# Patient Record
Sex: MALE | Race: WHITE
[De-identification: names, ages, dates, MRNs, and addresses within clinical notes are randomized per-mention and may not be internally consistent; named-entity substitution may affect disease eponyms.]

---

## 2018-12-05 ENCOUNTER — HOSPITAL ENCOUNTER (EMERGENCY)
Dept: HOSPITAL 25 - UCEAST | Age: 63
Discharge: HOME | End: 2018-12-05
Payer: COMMERCIAL

## 2018-12-05 VITALS — SYSTOLIC BLOOD PRESSURE: 125 MMHG | DIASTOLIC BLOOD PRESSURE: 77 MMHG
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
## 2018-12-05 DIAGNOSIS — S30.861A: Primary | ICD-10-CM
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
## 2018-12-05 DIAGNOSIS — Z88.0: ICD-10-CM

## 2018-12-05 PROCEDURE — G0463 HOSPITAL OUTPT CLINIC VISIT: HCPCS

## 2018-12-05 PROCEDURE — 99212 OFFICE O/P EST SF 10 MIN: CPT

## 2018-12-05 NOTE — UC
Skin Complaint HPI





- HPI Summary


HPI Summary: 








Healthy 62 yo male, with c/o tick bite 3 days ago;  tick on for approx 30 

hours.  Are of inguinal ligament, right groin;  tick removed;  surrounding 

redness now.





- History of Current Complaint


Chief Complaint: UCSkin


Time Seen by Provider: 12/05/18 13:54


Stated Complaint: TICK BITE


Pain Intensity: 0





- Allergy/Home Medications


Allergies/Adverse Reactions: 


 Allergies











Allergy/AdvReac Type Severity Reaction Status Date / Time


 


Penicillins Allergy  Unknown Verified 12/05/18 11:59





   Reaction  





   Details  














PMH/Surg Hx/FS Hx/Imm Hx





- Additional Past Medical History


Additional PMH: 





VISIT HISTORY: non contributory to present complaint


MEDICATIONS: non contributory to present complaint


CHRONIC CONDITIONS:none








Family history significant for: negative





SMOKING: negative





EMPLOYMENT: retired


LIVING CONDITIONS: uses cpap; lives with wife.





- Surgical History


Surgical History: Yes


Surgery Procedure, Year, and Place: colon ca





- Social History


Alcohol Use: Rare


Substance Use Type: None


Smoking Status (MU): Never Smoked Tobacco





Review of Systems


All Other Systems Reviewed And Are Negative: Yes


Constitutional: Positive: Negative, Fever


Skin: Positive: Rash - 2.5 cm erythema around a crusted lesion right groin.


Eyes: Positive: Negative


ENT: Positive: Negative


Respiratory: Positive: Negative


Cardiovascular: Positive: Negative


Gastrointestinal: Positive: Negative


Genitourinary: Positive: Negative


Motor: Positive: Negative


Neurovascular: Positive: Negative


Musculoskeletal: Positive: Negative


Neurological: Positive: Negative


Psychological: Positive: Negative


Is Patient Immunocompromised?: No





Physical Exam





- Summary


Physical Exam Summary: 





 


Appearance: The patient is well-appearing, is in no pain or distress, and is 

well-nourished.


Eyes: Conjunctiva are clear.  Pupils are equal and reactive to light and 

accommodation.  Extraocular muscle movement is intact.


ENT: The hearing is grossly normal, the pharynx is normal, and the TMs are 

normal. There is no muffled or hoarse voice.  No stridor.


Neck: The neck is supple and there is no lymphadenopathy.


Respiratory: The chest is nontender to palpation and without crepitus. The 

lungs are clear, there are normal breath sounds, and there is no respiratory 

distress.  No wheezes, rales or rhonchi.


Cardiovascular: Heart sounds reveal a regular rate and rhythm. There are no 

clicks, rubs or murmurs.  There are no carotid bruits or thrills.  Circulation 

is grossly intact.


Abdomen: The abdomen is soft and nontender. There is no organomegaly.  Bowel 

sounds are present and within normal limits.  No point tenderness at McBurneys 

point.


Musculoskeletal: Strength is intact. The patient moves all extremities.  


Neurological: The patient is alert. Motor and sensory examination grossly 

intact.  Speech is normal.


Psychological: The patient displays age appropriate behavior. Oriented to person

, place and time.


Skin: Tick bite located in inguinal region of the right groin.  Surrounding 

erythema to 2.5 cm.  No ascending lymphangitis or cellulitis.  Tick has been 

removed but area is crusted.  Tick head could still be embedded.  I explained 

this to the patient.


Triage Information Reviewed: Yes


Vital Signs: 


 Initial Vital Signs











Temp  98 F   12/05/18 11:56


 


Pulse  70   12/05/18 11:56


 


Resp  17   12/05/18 11:56


 


BP  113/76   12/05/18 11:56


 


Pulse Ox  100   12/05/18 11:56














Course/Dx





- Course


Course Of Treatment: 63 you male with tick bite for 30 hours 3 days ago now 

with surrounding erythema, possibly migrans, in right groin area.  Will start 

on doxycycline, 100mg, twice a day for 14 days.





- Differential Diagnoses - Skin Complaint


Differential Diagnoses: Cellulitis, Foreign Body, Other - tick bite, head may 

still be embedded under skin





- Diagnoses


Provider Diagnosis: 


 Tick bite








Discharge





- Sign-Out/Discharge


Documenting (check all that apply): Patient Departure


All imaging exams completed and their final reports reviewed: No Studies





- Discharge Plan


Condition: Stable


Disposition: HOME


Prescriptions: 


DOXYcycline CAP(*) [DOXYcycline 100MG CAP(*)] 100 mg PO BID #30 cap MDD 2


Patient Education Materials:  Lyme Disease (ED), Tick Bite (ED)


Referrals: 


No Primary Care Phys,NOPCP [Primary Care Provider] - 


Additional Instructions: 


AS WE DISCUSSED: 


 


PLEASE SEEK CARE AT THE EMERGENCY DEPARTMENT IF SYMPTOMS WORSEN OR IF NEW 

SYMPTOMS DEVELOP.  





FOLLOW UP WITH YOUR PRIMARY CARE PHYSICIAN IF CONDITION CONTINUES BEYOND 3 DAYS 

WITHOUT IMPROVEMENT.





YOUR DIAGNOSIS IS: tick bite





YOUR PRESCRIPTION RECOMMENDATION IS: doxycycline, twice a day for 14 days





OTHER INSTRUCTIONS: warm moist soaks to the area.  Watch for any spreading 

infection.





- Billing Disposition and Condition


Condition: STABLE


Disposition: Home

## 2020-03-02 ENCOUNTER — HOSPITAL ENCOUNTER (EMERGENCY)
Dept: HOSPITAL 25 - UCEAST | Age: 65
Discharge: HOME | End: 2020-03-02
Payer: OTHER GOVERNMENT

## 2020-03-02 VITALS — DIASTOLIC BLOOD PRESSURE: 68 MMHG | SYSTOLIC BLOOD PRESSURE: 109 MMHG

## 2020-03-02 DIAGNOSIS — S82.454A: Primary | ICD-10-CM
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
## 2020-03-02 DIAGNOSIS — Z88.0: ICD-10-CM

## 2020-03-02 DIAGNOSIS — Z79.82: ICD-10-CM
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
## 2020-03-02 PROCEDURE — G0463 HOSPITAL OUTPT CLINIC VISIT: HCPCS

## 2020-03-02 PROCEDURE — 99211 OFF/OP EST MAY X REQ PHY/QHP: CPT

## 2020-03-02 NOTE — UC
Lower Extremity/Ankle HPI





- HPI Summary


HPI Summary: 


64-year-old male presents with complaints of right lower leg and ankle pain.  

States on 2/27/2020 he was skiing, his ski got caught causing and external 

rotation injury to the leg and ankle.  Reports he has been able to bear weight 

on the leg however with a great deal of discomfort and he does not feel that he 

can adequately support himself therefore has been using crutches to ambulate.  

Reports bruising and swelling to the lower leg and ankle although has improved 

some.  Denies any numbness or tingling.





- History of Current Complaint


Chief Complaint: UCLowerExtremity


Stated Complaint: ANKLE INJURY


Time Seen by Provider: 03/02/20 12:11


Hx Obtained From: Patient


Pain Intensity: 2





- Allergies/Home Medications


Allergies/Adverse Reactions: 


 Allergies











Allergy/AdvReac Type Severity Reaction Status Date / Time


 


Penicillins Allergy  Unknown Verified 03/02/20 12:07





   Reaction  





   Details  











Home Medications: 


 Home Medications





Aspirin 81 mg CHEW TAB* [Aspirin Low Dose TAB*] 162 mg PO Q6H 03/02/20 [History 

Confirmed 03/02/20]


Ibuprofen TAB* [Advil TAB*] 200 mg PO Q6H PRN 03/02/20 [History Confirmed 03/02/ 20]











PMH/Surg Hx/FS Hx/Imm Hx


Previously Healthy: Yes





- Surgical History


Surgical History: Yes


Surgery Procedure, Year, and Place: colon ca





- Family History


Known Family History: 


   Negative: Diabetes





- Social History


Occupation: Retired


Lives: With Family


Alcohol Use: Occasionally


Substance Use Type: None


Smoking Status (MU): Never Smoked Tobacco





Review of Systems


All Other Systems Reviewed And Are Negative: Yes


Constitutional: Positive: Negative


Skin: Positive: Bruising


Respiratory: Positive: Negative


Cardiovascular: Positive: Negative


Gastrointestinal: Positive: Negative


Genitourinary: Positive: Negative


Motor: Negative: Weakness


Neurovascular: Negative: Decreased Sensation


Musculoskeletal: Positive: Other: - See HPI


Neurological/Mental Status: Positive: Negative


Is Patient Immunocompromised?: No





Physical Exam





- Summary


Physical Exam Summary: 


GENERAL APPEARANCE: Well developed, well nourished, alert and cooperative, and 

appears to be in no acute distress.





CARDIAC: Normal S1 and S2. No S3, S4 or murmurs. Rhythm is regular. There is no 

peripheral edema, cyanosis or pallor. Extremities are warm and well perfused. 

Capillary refill is less than 2 seconds. Peripheral pulses intact.





LUNGS: Clear to auscultation without rales, rhonchi, wheezing or diminished 

breath sounds.





ABDOMEN: Positive bowel sounds. Soft, nondistended, nontender. No guarding or 

rebound. No masses or hepatosplenomegally.





MUSKULOSKELETAL: Normal muscular development. Non-weightbearing using crutches.





EXTREMITIES: Tenderness along the lateral aspect of the right lower leg and to 

the anterior ankle with moderate edema and eccymosis present. Decreased ROM of 

the ankle due to pain and edema. Circulation and sensation intact.





SKIN: Skin normal color, texture and turgor with no lesions or eruptions.





Triage Information Reviewed: Yes


Vital Signs: 


 Initial Vital Signs











Temp  98.5 F   03/02/20 12:02


 


Pulse  58   03/02/20 12:02


 


Resp  16   03/02/20 12:02


 


BP  109/68   03/02/20 12:02


 


Pulse Ox  100   03/02/20 12:02











Vital Signs Reviewed: Yes





Procedures





- Splinting


  ** Right Lower Extremity


Location: Right lower leg


Hand-Made Type: orthoglass


Splint: Posterior short leg


Pre-Proc Neuro Vasc Exam: normal


Post-Proc Neuro Vasc Exam: normal


Splint Applied by Provider: Ryder Topete





Diagnostics





- Radiology


  ** No standard instances


Radiology Interpretation Completed By: Radiologist


Summary of Radiographic Findings: Order Information: TIBIA FIBULA RIGHT.  

HISTORY: skiing injury, external rotation of the leg .  COMPARISONS: March 02, 2020 right ankle.  VIEWS: 2, Frontal and lateral views of the right foreleg.  

FINDINGS:  BONE DENSITY: Normal.  BONES: There is a comminuted nondisplaced 

fracture involving the mid and proximal fibular diaphysis.  JOINTS: There is no 

arthropathy.  ALIGNMENT: There is no dislocation.  SOFT TISSUES: Unremarkable.  

OTHER FINDINGS: None.  IMPRESSION: COMMINUTED FRACTURE OF THE PROXIMAL FIBULA.  

Order Information: ANKLE RIGHT 3+VWS.  Indication: Swollen ankle with ankle 

injury.  3 views of the right ankle demonstrates soft tissue swelling. There is 

suggestion of a bony fragment in the proximal scaphoid whose margins are well-

corticated. Clinical correlation is suggested but this likely represents an old 

avulsion or accessory ossicle. Inferior calcaneal spur is noted.  IMPRESSION: 

Bony fragment off the inferior cuboid which is well-corticated likely 

represents accessory ossicle or old injury. Inferior calcaneal spur is noted. 

The medial malleolus and fibula are unremarkable.





Lower Extremity Course/Dx





- Course


Course Of Treatment: 


64-year-old male presents with complaints of right lower leg and ankle pain.  

States on 2/27/2020 he was skiing, his ski got caught causing and external 

rotation injury to the leg and ankle.  Reports he has been able to bear weight 

on the leg however with a great deal of discomfort and he does not feel that he 

can adequately support himself therefore has been using crutches to ambulate.  

Reports bruising and swelling to the lower leg and ankle although has improved 

some.  Denies any numbness or tingling.  Afebrile.  Vital signs stable.  On 

exam patient had tenderness along the lateral aspect of the right lower leg and 

to the anterior ankle with moderate edema and eccymosis present. Decreased ROM 

of the ankle due to pain and edema. Circulation and sensation intact.  X-ray 

showed a comminuted nondisplaced fracture involving the mid and proximal 

fibular diaphysis.  Reviewed results with the patient.  I placed him in a 

posterior short leg splint using Ortho-Glass.  Patient instructed to remain 

nonweightbearing.  Recommending conservative treatment for a nondisplaced 

comminuted fractures of the right tibia including over-the-counter analgesics 

and RICE.  He is to follow-up with orthopedic surgery in 3-5 days.  

Anticipatory guidance, splint care, and warning symptoms were reviewed with the 

patient.  Verbalizes understanding and agrees with plan of care.








- Differential Dx/Diagnosis


Differential Diagnosis/HQI/PQRI: Contusion, Dislocation, Fracture (Closed), 

Sprain


Provider Diagnosis: 


 Closed nondisplaced comminuted fracture of shaft of right fibula








Discharge ED





- Sign-Out/Discharge


Documenting (check all that apply): Patient Departure


All imaging exams completed and their final reports reviewed: Yes





- Discharge Plan


Condition: Stable


Disposition: HOME


Patient Education Materials:  Leg Fracture (ED), Crutch Instructions (ED), 

Splint Care (ED)


Referrals: 


No Primary Care Phys,NOPCP [Primary Care Provider] - 


Christiano Shah MD [Medical Doctor] -  (Follow up in 3-5 days. Call for 

appointment.)


Additional Instructions: 


The x-ray performed in the clinic today showed  evidence of a non-displaced 

comminuted fracture of the right fibula.





Rest the leg as much as possible. You should remain non-weigh bearing at this 

time. Continue using your crutches.





Wear the splint that was applied in the clinic at all times. Be sure not to get 

this wet.





Apply ice to the affected area for 15-20 minutes at least 4 times a day to help 

with the pain and swelling.





Elevate the leg to help reduce swelling.





Take acetaminophen (Tylenol) or ibuprofen (Advil, Motrin) according to 

directions as needed for pain.





Follow up with orthopedic surgery in 3-5 days for further evaluation and 

treatment. Call for appointment.





Seek immediate medical attention if you have severe pain not managed with pain 

medication, develop numbness or tingling in the leg, foot, or toes, or have any 

worsening of symptoms.





- Billing Disposition and Condition


Condition: STABLE


Disposition: Home

## 2020-03-02 NOTE — XMS REPORT
Continuity of Care Document (CCD)

 Created on:2020



Patient:Ash Constantino

Sex:Male

:1955

External Reference #:[id number]





Demographics







 Address  [address] [place] [address]

 

 Mobile Phone  [phone]

 

 Email Address  [email or web address]

 

 Preferred Language  en

 

 Marital Status  Not  or 

 

 Lutheran Affiliation  Unknown

 

 Race  White

 

 Ethnic Group  Not  or 









Author







 Name  Kami Juan DNP, RN, FNP-BC (transmitted by agent of provider 
Lilly Thorne)

 

 Address  [address] [facility], [address]



   [place] [address]









Care Team Providers







 Name  Role  Phone

 

 KevinNury nichols DO - Family  Care Team Information   [phone]
[phone]



 Medicine    









Problems







 Active Problems  Provider  Date

 

 Obstructive sleep apnea syndrome  Jonel Crocker M.D.  Onset: 2015

 

 Jet lag  Kami Juan DNP, RN, FNP-BC  Onset: 10/23/2017







Social History







 Type  Date  Description  Comments

 

 Birth Sex    Unknown  

 

 ETOH Use    Currently consumes alcohol  

 

 ETOH Use    Occasionally consumes alcohol  

 

 Tobacco Use  Start: Unknown  Patient has never smoked  

 

 Recreational Drug Use    Denies Drug Use  

 

 Smoking Status  Reviewed: 20  Patient has never smoked  

 

 Exercise Type/Frequency    Exercises regularly  







Allergies, Adverse Reactions, Alerts







 Active Allergies  Reaction  Severity  Comments  Date

 

 Penicillin        2012







Medications







 Active Medications  SIG  Qnty  Indications  Ordering Provider  Date

 

 Fluconazole  TK 1 T PO Weekly      Unknown  



          200mg Tablets          



           







Medications Administered in Office







 Medication  SIG  Qnty  Indications  Ordering Provider  Date

 

 Influenza Virus Vaccine        Unknown  10/01/2014



          Injection          







Immunizations







 CPT Code  Status  Date  Vaccine  Lot #

 

 [id number]  Given  10/12/2016  Influenza Virus Vaccine, Quadrivalent, Split,  



       Preservative Free  







Vital Signs







 Date  Vital  Result  Comment

 

 2020  8:26am  Height  70 inches  5'10"









 Weight  180.00 lb  

 

 Heart Rate  59 /min  

 

 BP Systolic  112 mmHg  

 

 BP Diastolic  68 mmHg  

 

 O2 % BldC Oximetry  99 %  

 

 BMI (Body Mass Index)  25.8 kg/m2  









 2019  1:40pm  Height  70 inches  5'10"









 Weight  185.00 lb  

 

 Heart Rate  60 /min  

 

 BP Systolic  104 mmHg  

 

 BP Diastolic  70 mmHg  

 

 O2 % BldC Oximetry  97 %  

 

 BMI (Body Mass Index)  26.5 kg/m2  







Results







 Description

 

 No Information Available







Procedures







 Date  Code  Description  Status

 

 2019  47409  Dest Lesion Each Addl Lesion 2 Through 14 Each  Completed

 

 2019  60507  Destruction ALL Benign Or Premalignant Lesion (Other Than  
Completed



     [place]  







Medical Devices







 Description

 

 No Information Available







Encounters







 Type  Date  Location  Provider  Dx  Diagnosis

 

 Office Visit  2019  [facility]  Kami Juan,  G47.33  
Obstructive sleep



   2:00p  [facility]  JELLY RN, URSZULA-BC    apnea (adult)



     [facility]      (pediatric)

 

 Office Visit  2019  [facility]  Juan Guy MD  B35.1  Tinea 
unguium



   9:30a        









 L82.1  Other seborrheic keratosis

 

 Z08  Encntr for follow-up exam after trtmt for malignant neoplasm

 

 Z85.828  Personal history of other malignant neoplasm of skin

 

 L57.0  Actinic keratosis







Assessments







 Date  Code  Description  Provider

 

 2020  G47.33  Obstructive sleep apnea (adult)  Kami Juan DNP, RN, 
CHEMA



     (pediatric)  

 

 2019  G47.33  Obstructive sleep apnea (adult)  Kami Juan DNP, RN, 
CHEMA



     (pediatric)  

 

 2019  B35.1  Tinea unguium  Juan Guy MD

 

 2019  L82.1  Other seborrheic keratosis  Juan Guy MD

 

 2019  Z08  Encounter for follow-up  Juan Guy MD



     examination after completed  



     treatment for malignant neoplasm  

 

 2019  Z85.828  Personal history of other  Juan Guy MD



     malignant neoplasm of skin  

 

 2019  L57.0  Actinic keratosis  Juan Guy MD







Plan of Treatment

Future Appointment(s):2020  8:30 am - Kami Juan DNP, RN, URSZULA-BC at 
[facility]2020 - Kami Juan DNP, RN, FNTRISTAN-
BCG47.33 Obstructive sleep apnea (adult) (pediatric)Comments:2020  NPS[facility]  
AHI 7.4/hour, tRDI 48.1/hour, vu oxygen 93%Follow up:2-3 
monthsRecommendations:Continue PAP device, Benefitting and compliant with 
treatment.  Mild sleep apnea with AHI 7.4/hour, TRDI 48.1/hour, recommend 
replacement of CPAP auto with modem if eligible.  Consider oral appliance 
mandibular device with tracking if you consider non-PAP treatment.  Cleaning 
Wipe off mask daily (babywipe-no scent, or warm water) Clean mask, tubing, 
filter, and water chamber weekly in mild no scent dish soap and water. Hang to 
dry.    If you have any sleepiness while driving you MUST avoid operating a 
vehicle or machinery. If you have difficulty with your equipment, or need to 
replace your mask orhoses, please contact your homecare agency. A weight change 
of 20 pounds or more may have an effect on your equipment; if you are 
experiencing problems please call for an appointment.  If you have any further 
questions, please call the [facility] at [phone].



Functional Status







 Description

 

 No Information Available







Mental Status







 Description

 

 No Information Available







Referrals







 Description

 

 No Information Available